# Patient Record
Sex: FEMALE
[De-identification: names, ages, dates, MRNs, and addresses within clinical notes are randomized per-mention and may not be internally consistent; named-entity substitution may affect disease eponyms.]

---

## 2024-03-06 ENCOUNTER — APPOINTMENT (OUTPATIENT)
Dept: ORTHOPEDIC SURGERY | Facility: CLINIC | Age: 54
End: 2024-03-06
Payer: COMMERCIAL

## 2024-03-06 VITALS — WEIGHT: 150 LBS | BODY MASS INDEX: 29.45 KG/M2 | HEIGHT: 60 IN

## 2024-03-06 DIAGNOSIS — M19.071 PRIMARY OSTEOARTHRITIS, RIGHT ANKLE AND FOOT: ICD-10-CM

## 2024-03-06 PROBLEM — Z00.00 ENCOUNTER FOR PREVENTIVE HEALTH EXAMINATION: Status: ACTIVE | Noted: 2024-03-06

## 2024-03-06 PROCEDURE — 99203 OFFICE O/P NEW LOW 30 MIN: CPT

## 2024-03-11 NOTE — HISTORY OF PRESENT ILLNESS
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

## 2024-03-11 NOTE — ASSESSMENT
[FreeTextEntry1] : Reviewed at length with patient severity of arthritis and treatment options and at this time patient elects anti-inflammatory and observation with the understanding of persistent symptoms she may require an ankle fusion or replacement

## 2024-03-11 NOTE — PHYSICAL EXAM
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   Gait and Station:  The patient ambulates with a normal gait and no limp.   Cardiovascular System:  Ther capillary refill is less than 2 seconds.   Skin:  There are no skin abnormalities of ankle.  Ankles and Feet:  Inspection:  There is no erythema. There is no induration. There is no warmth. There is no deformity.   There is mild diffuse swelling.   Bony Palpation:  There is no tenderness of the calcaneal tuberosity. There is no tenderness of the metatarsals. There is no tenderness of the tarsometatarsal joints There is no tenderness of the navicular tuberosity.  There is tenderness of the dome of talus. There is tenderness of the head of talus. There is no tenderness of the inferior tibiofibular joint.  Soft Tissue Palpation:  There is no tenderness of the tibialis posterior. There is no tenderness of the tibialis anterior.  There is no tenderness of the plantar fascia. There is no tenderness of the Achilles tendon. There is no tenderness of the extensor hallucis longus. There is no tenderness of the sinus tarsi.  There is no tenderness of the peroneus longus and brevis. There is no tenderness of the deltoid ligament.    There is no tenderness of the anterior talofibular ligament and the calcaneofibular ligament.   Active Range of Motion:  The range of motion at the ankle is DF 5 - PF 15   Stability:  The anterior drawer is negative.   Strength:  There is 5/5 ankle plantarflexion and dorsiflexion.  Neurological System:  There is normal sensation to light touch at the ankle and foot.   Psychiatric:  The patient demonstrates a normal mood and affect and is active and alert.

## 2024-06-03 ENCOUNTER — RX RENEWAL (OUTPATIENT)
Age: 54
End: 2024-06-03

## 2024-06-03 RX ORDER — DICLOFENAC SODIUM 75 MG/1
75 TABLET, DELAYED RELEASE ORAL
Qty: 60 | Refills: 2 | Status: ACTIVE | COMMUNITY
Start: 2024-03-06 | End: 1900-01-01

## 2024-09-09 ENCOUNTER — RX RENEWAL (OUTPATIENT)
Age: 54
End: 2024-09-09

## 2024-12-10 ENCOUNTER — RX RENEWAL (OUTPATIENT)
Age: 54
End: 2024-12-10